# Patient Record
Sex: MALE | Race: WHITE | NOT HISPANIC OR LATINO | URBAN - METROPOLITAN AREA
[De-identification: names, ages, dates, MRNs, and addresses within clinical notes are randomized per-mention and may not be internally consistent; named-entity substitution may affect disease eponyms.]

---

## 2022-07-05 ENCOUNTER — WEB ENCOUNTER (OUTPATIENT)
Dept: URBAN - METROPOLITAN AREA CLINIC 63 | Facility: CLINIC | Age: 77
End: 2022-07-05

## 2022-07-09 ENCOUNTER — TELEPHONE ENCOUNTER (OUTPATIENT)
Dept: URBAN - METROPOLITAN AREA CLINIC 121 | Facility: CLINIC | Age: 77
End: 2022-07-09

## 2022-07-09 RX ORDER — LISINOPRIL 5 MG/1
TABLET ORAL ONCE A DAY
Refills: 0 | OUTPATIENT
Start: 2018-07-13 | End: 2018-08-24

## 2022-07-09 RX ORDER — OMEPRAZOLE 20 MG/1
CAPSULE, DELAYED RELEASE ORAL ONCE A DAY
Refills: 0 | OUTPATIENT
Start: 2018-08-24 | End: 2018-09-05

## 2022-07-09 RX ORDER — OMEPRAZOLE 20 MG/1
CAPSULE, DELAYED RELEASE ORAL ONCE A DAY
Refills: 0 | OUTPATIENT
Start: 2018-07-13 | End: 2018-08-24

## 2022-07-09 RX ORDER — ALBUTEROL SULFATE 108 UG/1
AEROSOL, METERED RESPIRATORY (INHALATION) TAKE AS DIRECTED
Refills: 0 | OUTPATIENT
Start: 2018-07-13 | End: 2018-08-24

## 2022-07-09 RX ORDER — ACYCLOVIR 400 MG/1
TABLET ORAL TAKE AS DIRECTED
Refills: 0 | OUTPATIENT
Start: 2018-07-13 | End: 2018-08-24

## 2022-07-09 RX ORDER — INDAPAMIDE 1.25 MG/1
TABLET, FILM COATED ORAL ONCE A DAY
Refills: 0 | OUTPATIENT
Start: 2018-07-13 | End: 2018-08-24

## 2022-07-10 ENCOUNTER — TELEPHONE ENCOUNTER (OUTPATIENT)
Dept: URBAN - METROPOLITAN AREA CLINIC 121 | Facility: CLINIC | Age: 77
End: 2022-07-10

## 2022-07-10 RX ORDER — INDAPAMIDE 1.25 MG/1
TABLET, FILM COATED ORAL ONCE A DAY
Refills: 0 | Status: ACTIVE | COMMUNITY
Start: 2018-08-24

## 2022-07-10 RX ORDER — PANTOPRAZOLE SODIUM 40 MG/1
TABLET, DELAYED RELEASE ORAL ONCE A DAY
Refills: 0 | Status: ACTIVE | COMMUNITY
Start: 2018-09-05

## 2022-07-10 RX ORDER — ACYCLOVIR 400 MG/1
TABLET ORAL TAKE AS DIRECTED
Refills: 0 | Status: ACTIVE | COMMUNITY
Start: 2018-08-24

## 2022-07-10 RX ORDER — LISINOPRIL 5 MG/1
TABLET ORAL ONCE A DAY
Refills: 0 | Status: ACTIVE | COMMUNITY
Start: 2018-08-24

## 2022-07-10 RX ORDER — ALBUTEROL SULFATE 108 UG/1
AEROSOL, METERED RESPIRATORY (INHALATION) TAKE AS DIRECTED
Refills: 0 | Status: ACTIVE | COMMUNITY
Start: 2018-08-24

## 2022-07-11 ENCOUNTER — OFFICE VISIT (OUTPATIENT)
Dept: URBAN - METROPOLITAN AREA CLINIC 63 | Facility: CLINIC | Age: 77
End: 2022-07-11
Payer: MEDICARE

## 2022-07-11 VITALS
TEMPERATURE: 97.4 F | OXYGEN SATURATION: 99 % | WEIGHT: 210.4 LBS | DIASTOLIC BLOOD PRESSURE: 78 MMHG | HEART RATE: 84 BPM | SYSTOLIC BLOOD PRESSURE: 105 MMHG

## 2022-07-11 DIAGNOSIS — K21.00 GASTROESOPHAGEAL REFLUX DISEASE WITH ESOPHAGITIS, UNSPECIFIED WHETHER HEMORRHAGE: ICD-10-CM

## 2022-07-11 DIAGNOSIS — Z12.11 COLON CANCER SCREENING: ICD-10-CM

## 2022-07-11 PROBLEM — 266433003: Status: ACTIVE | Noted: 2022-07-11

## 2022-07-11 PROCEDURE — 99203 OFFICE O/P NEW LOW 30 MIN: CPT | Performed by: NURSE PRACTITIONER

## 2022-07-11 RX ORDER — ACYCLOVIR 400 MG/1
1 TABLET TABLET ORAL PRN
Status: ACTIVE | COMMUNITY

## 2022-07-11 RX ORDER — INDAPAMIDE 1.25 MG/1
1 TABLET IN THE MORNING TABLET, FILM COATED ORAL
Status: ACTIVE | COMMUNITY

## 2022-07-11 RX ORDER — BUDESONIDE AND FORMOTEROL FUMARATE DIHYDRATE 160; 4.5 UG/1; UG/1
2 PUFFS AEROSOL RESPIRATORY (INHALATION) TWICE A DAY
Status: ACTIVE | COMMUNITY

## 2022-07-11 RX ORDER — LISINOPRIL 5 MG/1
1 TABLET TABLET ORAL
Status: ACTIVE | COMMUNITY

## 2022-07-11 RX ORDER — PANTOPRAZOLE SODIUM 40 MG/1
1 TABLET TABLET, DELAYED RELEASE ORAL ONCE A DAY
Status: ACTIVE | COMMUNITY

## 2022-07-11 RX ORDER — PREDNISONE 10 MG/1
1 TABLET TABLET ORAL PRN
Status: ACTIVE | COMMUNITY

## 2022-07-11 NOTE — HPI-TODAY'S VISIT:
This is a 77 yo male patient with a personal history of serrated adenomatous polyps, history of GERD with prior EGD/dilation, who  presents to the office for colorectal cancer screening.  Today he denies any current bowel problems. No diarrhea, constipation, bowel changes or rectal bleeding. He is taking Pantoprazole for chronic GERD and reports intermittent dysphagia. He doesn't think it is at a point where it needs dilation, but he would like an EGD for further assessment. He denies heartburn or reflux unless he eats something aggravating.  His last colonoscopy in April 2019 revealed only internal hemorrhoids. The tattoo site was seen but was normal. No polyp growth/re-growth. He was advised to repeat his colonoscopy in 3 yrs.  His previous colonoscopy was performed with EGD in Aug 2018. Findings included a16mm flat serrated adenoma found in the prox. ascending colon. This was removed piecemeal with a snare. Fulgration to ablate the lesion remnants by snare was successful. The area was tattooed with Regi ink.   EGD findings at that time included an irregular Z line with benign biopsies. A stenosis was noted in the lower third of the esophagus with dilation to 54Fr. Mild chronic gastritis with focal intestinal metaplasia was found in the antrum without H. Pylori.

## 2022-07-18 ENCOUNTER — LAB OUTSIDE AN ENCOUNTER (OUTPATIENT)
Dept: URBAN - METROPOLITAN AREA CLINIC 63 | Facility: CLINIC | Age: 77
End: 2022-07-18

## 2022-08-15 ENCOUNTER — TELEPHONE ENCOUNTER (OUTPATIENT)
Dept: URBAN - METROPOLITAN AREA CLINIC 63 | Facility: CLINIC | Age: 77
End: 2022-08-15

## 2022-08-19 ENCOUNTER — WEB ENCOUNTER (OUTPATIENT)
Dept: URBAN - METROPOLITAN AREA SURGERY CENTER 4 | Facility: SURGERY CENTER | Age: 77
End: 2022-08-19

## 2022-08-24 ENCOUNTER — CLAIMS CREATED FROM THE CLAIM WINDOW (OUTPATIENT)
Dept: URBAN - METROPOLITAN AREA CLINIC 4 | Facility: CLINIC | Age: 77
End: 2022-08-24
Payer: MEDICARE

## 2022-08-24 ENCOUNTER — CLAIMS CREATED FROM THE CLAIM WINDOW (OUTPATIENT)
Dept: URBAN - METROPOLITAN AREA SURGERY CENTER 4 | Facility: SURGERY CENTER | Age: 77
End: 2022-08-24
Payer: MEDICARE

## 2022-08-24 DIAGNOSIS — K64.0 GRADE I INTERNAL HEMORRHOIDS: ICD-10-CM

## 2022-08-24 DIAGNOSIS — D12.0 POLYP OF CECUM: ICD-10-CM

## 2022-08-24 DIAGNOSIS — Z86.010 PERSONAL HISTORY OF COLONIC POLYPS: ICD-10-CM

## 2022-08-24 DIAGNOSIS — K30 DYSPEPSIA: ICD-10-CM

## 2022-08-24 DIAGNOSIS — R13.10 DYSPHAGIA: ICD-10-CM

## 2022-08-24 DIAGNOSIS — B37.81 CANDIDIASIS OF ESOPHAGUS: ICD-10-CM

## 2022-08-24 DIAGNOSIS — K29.70 GASTRITIS, UNSPECIFIED, WITHOUT BLEEDING: ICD-10-CM

## 2022-08-24 DIAGNOSIS — K63.89 OTHER SPECIFIED DISEASES OF INTESTINE: ICD-10-CM

## 2022-08-24 DIAGNOSIS — D12.3 POLYP OF TRANSVERSE COLON: ICD-10-CM

## 2022-08-24 DIAGNOSIS — K22.70 BARRETT'S ESOPHAGUS WITHOUT DYSPLASIA: ICD-10-CM

## 2022-08-24 DIAGNOSIS — K20.90 ESOPHAGITIS: ICD-10-CM

## 2022-08-24 DIAGNOSIS — D12.0 BENIGN NEOPLASM OF CECUM: ICD-10-CM

## 2022-08-24 DIAGNOSIS — K21.9 GASTRO-ESOPHAGEAL REFLUX DISEASE WITHOUT ESOPHAGITIS: ICD-10-CM

## 2022-08-24 PROCEDURE — 45380 COLONOSCOPY AND BIOPSY: CPT | Performed by: INTERNAL MEDICINE

## 2022-08-24 PROCEDURE — 45385 COLONOSCOPY W/LESION REMOVAL: CPT | Performed by: INTERNAL MEDICINE

## 2022-08-24 PROCEDURE — 43239 EGD BIOPSY SINGLE/MULTIPLE: CPT | Performed by: CLINIC/CENTER

## 2022-08-24 PROCEDURE — 45380 COLONOSCOPY AND BIOPSY: CPT | Performed by: CLINIC/CENTER

## 2022-08-24 PROCEDURE — G8907 PT DOC NO EVENTS ON DISCHARG: HCPCS | Performed by: CLINIC/CENTER

## 2022-08-24 PROCEDURE — 43239 EGD BIOPSY SINGLE/MULTIPLE: CPT | Performed by: INTERNAL MEDICINE

## 2022-08-24 PROCEDURE — 88305 TISSUE EXAM BY PATHOLOGIST: CPT | Performed by: PATHOLOGY

## 2022-08-24 PROCEDURE — G8918 PT W/O PREOP ORDER IV AB PRO: HCPCS | Performed by: CLINIC/CENTER

## 2022-08-24 PROCEDURE — 45385 COLONOSCOPY W/LESION REMOVAL: CPT | Performed by: CLINIC/CENTER

## 2022-08-24 PROCEDURE — 88312 SPECIAL STAINS GROUP 1: CPT | Performed by: PATHOLOGY

## 2022-08-24 RX ORDER — ACYCLOVIR 400 MG/1
1 TABLET TABLET ORAL PRN
Status: ACTIVE | COMMUNITY

## 2022-08-24 RX ORDER — INDAPAMIDE 1.25 MG/1
1 TABLET IN THE MORNING TABLET, FILM COATED ORAL
Status: ACTIVE | COMMUNITY

## 2022-08-24 RX ORDER — LISINOPRIL 5 MG/1
1 TABLET TABLET ORAL
Status: ACTIVE | COMMUNITY

## 2022-08-24 RX ORDER — PANTOPRAZOLE SODIUM 40 MG/1
1 TABLET TABLET, DELAYED RELEASE ORAL ONCE A DAY
Status: ACTIVE | COMMUNITY

## 2022-08-24 RX ORDER — BUDESONIDE AND FORMOTEROL FUMARATE DIHYDRATE 160; 4.5 UG/1; UG/1
2 PUFFS AEROSOL RESPIRATORY (INHALATION) TWICE A DAY
Status: ACTIVE | COMMUNITY

## 2022-08-24 RX ORDER — PREDNISONE 10 MG/1
1 TABLET TABLET ORAL PRN
Status: ACTIVE | COMMUNITY

## 2022-08-25 ENCOUNTER — TELEPHONE ENCOUNTER (OUTPATIENT)
Dept: URBAN - METROPOLITAN AREA CLINIC 63 | Facility: CLINIC | Age: 77
End: 2022-08-25

## 2022-08-25 RX ORDER — FLUCONAZOLE 100 MG/1
AS DIRECTED TABLET ORAL
Qty: 15 TABLET | Refills: 0 | OUTPATIENT

## 2022-08-26 PROBLEM — 40739000 DYSPHAGIA: Status: ACTIVE | Noted: 2022-08-26

## 2022-08-26 PROBLEM — 162031009 DYSPEPSIA: Status: ACTIVE | Noted: 2022-08-26

## 2022-09-01 ENCOUNTER — TELEPHONE ENCOUNTER (OUTPATIENT)
Dept: URBAN - METROPOLITAN AREA CLINIC 63 | Facility: CLINIC | Age: 77
End: 2022-09-01

## 2022-09-06 ENCOUNTER — TELEPHONE ENCOUNTER (OUTPATIENT)
Dept: URBAN - METROPOLITAN AREA CLINIC 63 | Facility: CLINIC | Age: 77
End: 2022-09-06

## 2022-09-07 ENCOUNTER — OFFICE VISIT (OUTPATIENT)
Dept: URBAN - METROPOLITAN AREA CLINIC 63 | Facility: CLINIC | Age: 77
End: 2022-09-07

## 2022-09-07 ENCOUNTER — TELEPHONE ENCOUNTER (OUTPATIENT)
Dept: URBAN - METROPOLITAN AREA CLINIC 63 | Facility: CLINIC | Age: 77
End: 2022-09-07

## 2022-09-07 RX ORDER — INDAPAMIDE 1.25 MG/1
1 TABLET IN THE MORNING TABLET, FILM COATED ORAL
COMMUNITY

## 2022-09-07 RX ORDER — ACYCLOVIR 400 MG/1
1 TABLET TABLET ORAL PRN
COMMUNITY

## 2022-09-07 RX ORDER — LISINOPRIL 5 MG/1
1 TABLET TABLET ORAL
COMMUNITY

## 2022-09-07 RX ORDER — PANTOPRAZOLE SODIUM 40 MG/1
1 TABLET TABLET, DELAYED RELEASE ORAL ONCE A DAY
COMMUNITY

## 2022-09-07 RX ORDER — FLUCONAZOLE 100 MG/1
AS DIRECTED TABLET ORAL
Qty: 15 TABLET | Refills: 0 | COMMUNITY

## 2022-09-07 RX ORDER — BUDESONIDE AND FORMOTEROL FUMARATE DIHYDRATE 160; 4.5 UG/1; UG/1
2 PUFFS AEROSOL RESPIRATORY (INHALATION) TWICE A DAY
COMMUNITY

## 2022-09-07 RX ORDER — PREDNISONE 10 MG/1
1 TABLET TABLET ORAL PRN
COMMUNITY

## 2022-09-07 NOTE — HPI-TODAY'S VISIT:
This is a 76-year-old male with a history of serrated adenomatous polyps, history of GERD with prior EGD/dilation who presents to the office for follow-up after recent EGD/colonoscopy. EGD revealed visualization of esophagitis in the upper and middle third of the esophagus consistent with Candida infection.  A course of Diflucan was called in for him.  Biopsies report Non-severe esophagitis was found at the GE junction with biopsies reporting Diffuse mild inflammation was found in the gastric antrum with biopsies reporting The duodenum appeared normal. He takes pantoprazole daily for chronic GERD and denies heartburn or reflux unless he eats something aggravating.  He denies any current bowel problems.

## 2022-09-27 ENCOUNTER — TELEPHONE ENCOUNTER (OUTPATIENT)
Dept: URBAN - METROPOLITAN AREA CLINIC 63 | Facility: CLINIC | Age: 77
End: 2022-09-27

## 2022-10-13 ENCOUNTER — OFFICE VISIT (OUTPATIENT)
Dept: URBAN - METROPOLITAN AREA TELEHEALTH 1 | Facility: TELEHEALTH | Age: 77
End: 2022-10-13
Payer: MEDICARE

## 2022-10-13 VITALS — WEIGHT: 210 LBS | HEIGHT: 73 IN | BODY MASS INDEX: 27.83 KG/M2

## 2022-10-13 DIAGNOSIS — D12.3 ADENOMATOUS POLYP OF TRANSVERSE COLON: ICD-10-CM

## 2022-10-13 DIAGNOSIS — K22.70 BARRETT'S ESOPHAGUS WITHOUT DYSPLASIA: ICD-10-CM

## 2022-10-13 DIAGNOSIS — K64.0 GRADE I HEMORRHOIDS: ICD-10-CM

## 2022-10-13 PROBLEM — 302914006: Status: ACTIVE | Noted: 2022-10-13

## 2022-10-13 PROCEDURE — 99213 OFFICE O/P EST LOW 20 MIN: CPT | Performed by: NURSE PRACTITIONER

## 2022-10-13 RX ORDER — LISINOPRIL 5 MG/1
1 TABLET TABLET ORAL
Status: ACTIVE | COMMUNITY

## 2022-10-13 RX ORDER — ACYCLOVIR 400 MG/1
1 TABLET TABLET ORAL PRN
Status: ACTIVE | COMMUNITY

## 2022-10-13 RX ORDER — FLUCONAZOLE 100 MG/1
AS DIRECTED TABLET ORAL
Qty: 15 TABLET | Refills: 0 | Status: ACTIVE | COMMUNITY

## 2022-10-13 RX ORDER — BUDESONIDE AND FORMOTEROL FUMARATE DIHYDRATE 160; 4.5 UG/1; UG/1
2 PUFFS AEROSOL RESPIRATORY (INHALATION) TWICE A DAY
Status: ACTIVE | COMMUNITY

## 2022-10-13 RX ORDER — PREDNISONE 10 MG/1
1 TABLET TABLET ORAL PRN
Status: ACTIVE | COMMUNITY

## 2022-10-13 RX ORDER — INDAPAMIDE 1.25 MG/1
1 TABLET IN THE MORNING TABLET, FILM COATED ORAL
Status: ACTIVE | COMMUNITY

## 2022-10-13 RX ORDER — PANTOPRAZOLE SODIUM 40 MG/1
1 TABLET TABLET, DELAYED RELEASE ORAL ONCE A DAY
Status: ACTIVE | COMMUNITY

## 2022-10-13 NOTE — HPI-TODAY'S VISIT:
This is a 76-year-old male with a history of serrated adenomatous polyps, history of chronic GERD with prior EGD/dilation who presents to the office for follow-up after recent EGD/colonoscopy.  He takes Pantoprazole 40mg daily for chronic GERD and denies heartburn or reflux unless he eats something aggravating. Of note however is that his voice sounds hoarse and this is also chronic.  He has been taking PPI daily for many years. Previously he was taking Omeprazole but [facility] it and changed him to Pantoprazole.  He denies any current bowel problems.

## 2022-10-13 NOTE — HPI-PREVIOUS PROCEDURES
EGD revealed visualization of esophagitis in the upper and middle third of the esophagus consistent with Candida infection.  A course of Diflucan was called in for him.  Biopsies reported squamous mucosa with reflux type changes and no evidence of infection or EoE. A course of Diflucan was called in for him post procedure due to visualization of what appeared to be candidiasis. Non-severe esophagitis was found at the GE junction with biopsies reporting findings of Carranza's esophagus. Diffuse mild inflammation was found in the gastric antrum with biopsies reporting mild chemical/reactive gastropathy. The duodenum appeared normal. Prior EGD in 2018 did not reveal Carranza's, but a stenosis was dilated.  Colonoscopy revealed 2 x 10mm sessile serrated adenomas removed from the cecum and a 6mm flat mucosal polyp removed from the transverse colon. A tattoo and post polypectomy scar was noted in the prox. ascending colon. There was no evidence of regrowth of the large serrated adenoma removed in 2018. Small internal hemorrhoids were noted.

## 2022-11-29 ENCOUNTER — TELEPHONE ENCOUNTER (OUTPATIENT)
Dept: URBAN - METROPOLITAN AREA CLINIC 63 | Facility: CLINIC | Age: 77
End: 2022-11-29

## 2022-11-29 RX ORDER — ESOMEPRAZOLE MAGNESIUM 40 MG/1
1 CAPSULE CAPSULE, DELAYED RELEASE ORAL ONCE A DAY
Qty: 30 TABLET | Refills: 11 | OUTPATIENT

## 2024-05-01 ENCOUNTER — LAB OUTSIDE AN ENCOUNTER (OUTPATIENT)
Dept: URBAN - METROPOLITAN AREA CLINIC 60 | Facility: CLINIC | Age: 79
End: 2024-05-01

## 2024-05-01 ENCOUNTER — OFFICE VISIT (OUTPATIENT)
Dept: URBAN - METROPOLITAN AREA CLINIC 60 | Facility: CLINIC | Age: 79
End: 2024-05-01
Payer: MEDICARE

## 2024-05-01 VITALS
OXYGEN SATURATION: 95 % | WEIGHT: 216.6 LBS | SYSTOLIC BLOOD PRESSURE: 128 MMHG | BODY MASS INDEX: 28.71 KG/M2 | HEART RATE: 75 BPM | RESPIRATION RATE: 12 BRPM | DIASTOLIC BLOOD PRESSURE: 72 MMHG | HEIGHT: 73 IN | TEMPERATURE: 98.7 F

## 2024-05-01 DIAGNOSIS — K64.0 GRADE I HEMORRHOIDS: ICD-10-CM

## 2024-05-01 DIAGNOSIS — D12.3 ADENOMATOUS POLYP OF TRANSVERSE COLON: ICD-10-CM

## 2024-05-01 DIAGNOSIS — K22.70 BARRETT'S ESOPHAGUS WITHOUT DYSPLASIA: ICD-10-CM

## 2024-05-01 PROCEDURE — 99214 OFFICE O/P EST MOD 30 MIN: CPT | Performed by: PHYSICIAN ASSISTANT

## 2024-05-01 RX ORDER — FAMOTIDINE 40 MG/1
1 TABLET AT BEDTIME TABLET, FILM COATED ORAL ONCE A DAY
Status: ACTIVE | COMMUNITY

## 2024-05-01 RX ORDER — ROSUVASTATIN CALCIUM 10 MG/1
TAKE ONE TABLET BY MOUTH ONE TIME DAILY TABLET, COATED ORAL
Qty: 90 UNSPECIFIED | Refills: 0 | Status: ACTIVE | COMMUNITY

## 2024-05-01 RX ORDER — BUDESONIDE AND FORMOTEROL FUMARATE DIHYDRATE 160; 4.5 UG/1; UG/1
2 PUFFS AEROSOL RESPIRATORY (INHALATION) TWICE A DAY
Status: ACTIVE | COMMUNITY

## 2024-05-01 RX ORDER — CITALOPRAM HYDROBROMIDE 20 MG/1
TAKE ONE AND ONE-HALF TABLET BY MOUTH EVERY MORNING TABLET ORAL
Qty: 135 UNSPECIFIED | Refills: 0 | Status: ACTIVE | COMMUNITY

## 2024-05-01 RX ORDER — TAMSULOSIN HYDROCHLORIDE 0.4 MG/1
TAKE ONE CAPSULE BY MOUTH NIGHTLY CAPSULE ORAL
Qty: 90 UNSPECIFIED | Refills: 2 | Status: ACTIVE | COMMUNITY

## 2024-05-01 RX ORDER — PANTOPRAZOLE SODIUM 40 MG/1
1 TABLET TABLET, DELAYED RELEASE ORAL ONCE A DAY
Status: ACTIVE | COMMUNITY

## 2024-05-01 RX ORDER — CLOPIDOGREL BISULFATE 75 MG/1
TAKE ONE TABLET BY MOUTH EVERY EVENING TABLET, FILM COATED ORAL
Qty: 90 UNSPECIFIED | Refills: 0 | Status: ACTIVE | COMMUNITY

## 2024-05-01 RX ORDER — PREDNISONE 10 MG/1
1 TABLET TABLET ORAL ONCE A DAY
Status: ACTIVE | COMMUNITY

## 2024-05-01 RX ORDER — ACYCLOVIR 400 MG/1
1 TABLET TABLET ORAL PRN
Status: ACTIVE | COMMUNITY

## 2024-06-05 ENCOUNTER — TELEPHONE ENCOUNTER (OUTPATIENT)
Dept: URBAN - METROPOLITAN AREA CLINIC 63 | Facility: CLINIC | Age: 79
End: 2024-06-05

## 2024-06-20 ENCOUNTER — OUT OF OFFICE VISIT (OUTPATIENT)
Dept: URBAN - METROPOLITAN AREA SURGERY CENTER 4 | Facility: SURGERY CENTER | Age: 79
End: 2024-06-20
Payer: MEDICARE

## 2024-06-20 ENCOUNTER — CLAIMS CREATED FROM THE CLAIM WINDOW (OUTPATIENT)
Dept: URBAN - METROPOLITAN AREA CLINIC 4 | Facility: CLINIC | Age: 79
End: 2024-06-20
Payer: MEDICARE

## 2024-06-20 DIAGNOSIS — K29.70 GASTRITIS: ICD-10-CM

## 2024-06-20 DIAGNOSIS — K31.7 POLYP OF STOMACH AND DUODENUM: ICD-10-CM

## 2024-06-20 DIAGNOSIS — K29.70 GASTRITIS, UNSPECIFIED, WITHOUT BLEEDING: ICD-10-CM

## 2024-06-20 DIAGNOSIS — K29.70 GASTRITIS WITHOUT BLEEDING, UNSPECIFIED CHRONICITY, UNSPECIFIED GASTRITIS TYPE: ICD-10-CM

## 2024-06-20 DIAGNOSIS — K31.89 OTHER DISEASES OF STOMACH AND DUODENUM: ICD-10-CM

## 2024-06-20 DIAGNOSIS — K31.7 GASTRIC POLYPS: ICD-10-CM

## 2024-06-20 DIAGNOSIS — K21.9 GASTRO-ESOPHAGEAL REFLUX DISEASE WITHOUT ESOPHAGITIS: ICD-10-CM

## 2024-06-20 DIAGNOSIS — K22.89 OTHER SPECIFIED DISEASE OF ESOPHAGUS: ICD-10-CM

## 2024-06-20 PROCEDURE — 00731 ANES UPR GI NDSC PX NOS: CPT | Performed by: NURSE ANESTHETIST, CERTIFIED REGISTERED

## 2024-06-20 PROCEDURE — 88312 SPECIAL STAINS GROUP 1: CPT | Performed by: PATHOLOGY

## 2024-06-20 PROCEDURE — 88305 TISSUE EXAM BY PATHOLOGIST: CPT | Performed by: PATHOLOGY

## 2024-06-20 PROCEDURE — 43239 EGD BIOPSY SINGLE/MULTIPLE: CPT | Performed by: CLINIC/CENTER

## 2024-06-20 PROCEDURE — 43239 EGD BIOPSY SINGLE/MULTIPLE: CPT | Performed by: INTERNAL MEDICINE

## 2024-06-20 RX ORDER — BUDESONIDE AND FORMOTEROL FUMARATE DIHYDRATE 160; 4.5 UG/1; UG/1
2 PUFFS AEROSOL RESPIRATORY (INHALATION) TWICE A DAY
Status: ACTIVE | COMMUNITY

## 2024-06-20 RX ORDER — ROSUVASTATIN CALCIUM 10 MG/1
TAKE ONE TABLET BY MOUTH ONE TIME DAILY TABLET, COATED ORAL
Qty: 90 UNSPECIFIED | Refills: 0 | Status: ACTIVE | COMMUNITY

## 2024-06-20 RX ORDER — FAMOTIDINE 40 MG/1
1 TABLET AT BEDTIME TABLET, FILM COATED ORAL ONCE A DAY
Status: ACTIVE | COMMUNITY

## 2024-06-20 RX ORDER — CITALOPRAM HYDROBROMIDE 20 MG/1
TAKE ONE AND ONE-HALF TABLET BY MOUTH EVERY MORNING TABLET ORAL
Qty: 135 UNSPECIFIED | Refills: 0 | Status: ACTIVE | COMMUNITY

## 2024-06-20 RX ORDER — TAMSULOSIN HYDROCHLORIDE 0.4 MG/1
TAKE ONE CAPSULE BY MOUTH NIGHTLY CAPSULE ORAL
Qty: 90 UNSPECIFIED | Refills: 2 | Status: ACTIVE | COMMUNITY

## 2024-06-20 RX ORDER — PANTOPRAZOLE SODIUM 40 MG/1
1 TABLET TABLET, DELAYED RELEASE ORAL ONCE A DAY
Status: ACTIVE | COMMUNITY

## 2024-06-20 RX ORDER — PREDNISONE 10 MG/1
1 TABLET TABLET ORAL ONCE A DAY
Status: ACTIVE | COMMUNITY

## 2024-06-20 RX ORDER — ACYCLOVIR 400 MG/1
1 TABLET TABLET ORAL PRN
Status: ACTIVE | COMMUNITY

## 2024-06-20 RX ORDER — CLOPIDOGREL BISULFATE 75 MG/1
TAKE ONE TABLET BY MOUTH EVERY EVENING TABLET, FILM COATED ORAL
Qty: 90 UNSPECIFIED | Refills: 0 | Status: ACTIVE | COMMUNITY

## 2024-07-05 ENCOUNTER — DASHBOARD ENCOUNTERS (OUTPATIENT)
Age: 79
End: 2024-07-05

## 2024-07-05 ENCOUNTER — OFFICE VISIT (OUTPATIENT)
Dept: URBAN - METROPOLITAN AREA CLINIC 60 | Facility: CLINIC | Age: 79
End: 2024-07-05
Payer: MEDICARE

## 2024-07-05 VITALS
WEIGHT: 217.9 LBS | DIASTOLIC BLOOD PRESSURE: 76 MMHG | HEART RATE: 69 BPM | HEIGHT: 73 IN | TEMPERATURE: 98.6 F | OXYGEN SATURATION: 96 % | SYSTOLIC BLOOD PRESSURE: 118 MMHG | BODY MASS INDEX: 28.88 KG/M2

## 2024-07-05 DIAGNOSIS — Z86.010 PERSONAL HISTORY OF COLONIC POLYPS: ICD-10-CM

## 2024-07-05 DIAGNOSIS — K22.70 BARRETT'S ESOPHAGUS WITHOUT DYSPLASIA: ICD-10-CM

## 2024-07-05 PROCEDURE — 99214 OFFICE O/P EST MOD 30 MIN: CPT | Performed by: PHYSICIAN ASSISTANT

## 2024-07-05 RX ORDER — ACYCLOVIR 400 MG/1
1 TABLET TABLET ORAL PRN
Status: ACTIVE | COMMUNITY

## 2024-07-05 RX ORDER — FAMOTIDINE 40 MG/1
1 TABLET AT BEDTIME TABLET, FILM COATED ORAL ONCE A DAY
Status: ACTIVE | COMMUNITY

## 2024-07-05 RX ORDER — CLOPIDOGREL BISULFATE 75 MG/1
TAKE ONE TABLET BY MOUTH EVERY EVENING TABLET, FILM COATED ORAL
Qty: 90 UNSPECIFIED | Refills: 0 | Status: ACTIVE | COMMUNITY

## 2024-07-05 RX ORDER — CITALOPRAM 30 MG/1
TAKE ONE AND ONE-HALF TABLET BY MOUTH EVERY MORNING CAPSULE ORAL ONCE A DAY
Refills: 0 | Status: ACTIVE | COMMUNITY

## 2024-07-05 RX ORDER — PREDNISONE 10 MG/1
1 TABLET TABLET ORAL ONCE A DAY
Status: ACTIVE | COMMUNITY

## 2024-07-05 RX ORDER — ROSUVASTATIN CALCIUM 10 MG/1
1 TABLET TABLET, FILM COATED ORAL ONCE A DAY
Status: ACTIVE | COMMUNITY

## 2024-07-05 RX ORDER — TAMSULOSIN HYDROCHLORIDE 0.4 MG/1
TAKE ONE CAPSULE BY MOUTH NIGHTLY CAPSULE ORAL
Qty: 90 UNSPECIFIED | Refills: 2 | Status: ACTIVE | COMMUNITY

## 2024-07-05 RX ORDER — PANTOPRAZOLE SODIUM 40 MG/1
1 TABLET TABLET, DELAYED RELEASE ORAL ONCE A DAY
Status: ACTIVE | COMMUNITY

## 2024-07-05 RX ORDER — ALBUTEROL SULFATE 90 UG/1
1 PUFF AS NEEDED AEROSOL, METERED RESPIRATORY (INHALATION)
Status: ACTIVE | COMMUNITY

## 2024-07-05 RX ORDER — PANTOPRAZOLE SODIUM 40 MG/1
1 TABLET TABLET, DELAYED RELEASE ORAL TWICE A DAY
Qty: 180 TABLET | Refills: 1 | OUTPATIENT
Start: 2024-07-05

## 2024-07-05 NOTE — HPI-TODAY'S VISIT:
78-year-old male with MCI, TIA on Plavix, hypertension, GERD, Carranza's esophagus, COPD, colon polyps, dysphagia with prior EGD with dilation presented to the office in May 2024 to schedule repeat EGD.  He had an EGD in August 2022 which demonstrated Candida esophagitis and Carranza's esophagus.  He was treated with a course of fluconazole following his EGD.  He was taking pantoprazole 40 mg daily in the morning and famotidine at night and had no GERD symptoms. . He explains he still has some occasional breakthrough symptoms on the pantoprazole 40mg and famotidine 40mg some days. Due to his history of Carranza's and his symptoms, he will be switched from pantoprazole 40mg and famotidine 40mg daily to pantoprazole 40mg twice a day. Prescription was sent to his pharmacy. Otherwise, he follows up today doing well. He had no problems following his procedure. He has no other GI complaints at this time.  Patient denies abdominal pain, belching, bloating, constipation, diarrhea, dysphagia, melena, hematochezia, BRBPR, nausea, vomting, hematemesis, coffee-ground emesis. . Surveillance EGD was done on June 20, 2024.  EGD demonstrated esophageal mucosal changes classified as Carranza's stage C0-M1 in the lower third of the esophagus.  Distal esophageal biopsy showed reflux type changes.  No evidence of intestinal metaplasia.  Mild inflammation was found in the gastric antrum.  Gastric biopsy was negative for H. pylori.  There were a few small sessile polyps in the gastric fundus and body.  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  Normal duodenum. Surveillance EGD is recommended in 3 years. . Colonoscopy 8/24/2022 for high risk surveillance with personal history of colon polyps: A 6 mm benign mucosal polyp was removed from the distal transverse colon.  A tattoo was seen in the proximal ascending colon associated with a post polypectomy scar.  2, 10 mm sessile serrated adenomas were removed from the cecum.  Small internal hemorrhoids were observed.  Surveillance colonoscopy was recommended in 3 years.